# Patient Record
Sex: FEMALE | Race: WHITE
[De-identification: names, ages, dates, MRNs, and addresses within clinical notes are randomized per-mention and may not be internally consistent; named-entity substitution may affect disease eponyms.]

---

## 2019-02-06 ENCOUNTER — HOSPITAL ENCOUNTER (EMERGENCY)
Dept: HOSPITAL 53 - M ED | Age: 19
Discharge: HOME | End: 2019-02-06
Payer: COMMERCIAL

## 2019-02-06 VITALS — DIASTOLIC BLOOD PRESSURE: 80 MMHG | SYSTOLIC BLOOD PRESSURE: 130 MMHG

## 2019-02-06 VITALS — HEIGHT: 63 IN | BODY MASS INDEX: 34.45 KG/M2 | WEIGHT: 194.4 LBS

## 2019-02-06 DIAGNOSIS — H65.03: ICD-10-CM

## 2019-02-06 DIAGNOSIS — J06.9: Primary | ICD-10-CM

## 2019-02-15 ENCOUNTER — HOSPITAL ENCOUNTER (OUTPATIENT)
Dept: HOSPITAL 53 - M LDO | Age: 19
Discharge: HOME | End: 2019-02-15
Attending: OBSTETRICS & GYNECOLOGY
Payer: COMMERCIAL

## 2019-02-15 VITALS — HEIGHT: 63 IN | WEIGHT: 197.09 LBS | BODY MASS INDEX: 34.92 KG/M2

## 2019-02-15 VITALS — SYSTOLIC BLOOD PRESSURE: 119 MMHG | DIASTOLIC BLOOD PRESSURE: 63 MMHG

## 2019-02-15 DIAGNOSIS — Z04.3: Primary | ICD-10-CM
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
## 2019-02-15 DIAGNOSIS — Z3A.35: ICD-10-CM

## 2019-02-15 DIAGNOSIS — O26.893: ICD-10-CM
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
## 2019-02-15 LAB
APTT BLD: 27.4 SECONDS (ref 25.4–37.6)
CLOSURE TME COLL+EPINEP BLD: 105 SECONDS (ref 74–162)
HCT VFR BLD AUTO: 31.4 % (ref 36–47)
HGB BLD-MCNC: 10.6 G/DL (ref 12–15.5)
INR PPP: 1
MCH RBC QN AUTO: 31.5 PG (ref 27–33)
MCHC RBC AUTO-ENTMCNC: 33.8 G/DL (ref 32–36.5)
MCV RBC AUTO: 93.5 FL (ref 80–96)
PLATELET # BLD AUTO: 202 10^3/UL (ref 150–450)
PROTHROMBIN TIME: 13.3 SECONDS (ref 12.1–14.4)
RBC # BLD AUTO: 3.36 10^6/UL (ref 4–5.4)
WBC # BLD AUTO: 8.8 10^3/UL (ref 4–10)

## 2019-02-15 PROCEDURE — 85384 FIBRINOGEN ACTIVITY: CPT

## 2019-02-15 PROCEDURE — 85576 BLOOD PLATELET AGGREGATION: CPT

## 2019-02-15 PROCEDURE — 85027 COMPLETE CBC AUTOMATED: CPT

## 2019-02-15 PROCEDURE — 36415 COLL VENOUS BLD VENIPUNCTURE: CPT

## 2019-02-15 PROCEDURE — 59025 FETAL NON-STRESS TEST: CPT

## 2019-02-15 PROCEDURE — 76815 OB US LIMITED FETUS(S): CPT

## 2019-02-15 PROCEDURE — 85610 PROTHROMBIN TIME: CPT

## 2019-02-15 PROCEDURE — 76820 UMBILICAL ARTERY ECHO: CPT

## 2019-02-15 PROCEDURE — 85730 THROMBOPLASTIN TIME PARTIAL: CPT

## 2019-02-15 NOTE — NUR
[facility] L&D Triage Note: 



S: This is a 17 y/o G1 at 35+5 weeks who presented s/p falling down a couple of 
stairs and landing on her buttocks/low back earlier this morning. Following 
her time during evaluation on L&D she continues to deny VB/LOF/CTX and 
endorses excellent FM. Her pregnancy has been c/b anemia, smoking but quit, 
and she is giving this baby up for adoption. 



O: VSS/AF

GEN A&Ox3

PUL: CTAB

CV: RRR

ABD: gravid, NNTP; relaxed uterine resting tone

[place]: no palpable ctx

EXT: + DTR's, NEG clonus



FHR: CAT 1, no ctx

TAUS: SIUP; FHR 135bpm. DARIN 13.2. Cephalic vtx, anterior placenta no previa or 
abruption

LABS: H/H-10.6; 31.4; Fibrinogen 533, 



A/P: 17 y/o G1 35+5 s/p fall. After 4 hours of continuous FM w/ CAT I FHR. She 
is being dc'd to home with reassuring fetal status and no evidence of 
abruption. Extensive review of warning signs, return precautions, and 
reinforced importance of daily FMC assessments. She has MANNIE this week and has 
been instructed to keep appt. Pt reports understanding of all instructions w/o 
questions/concerns.

## 2019-02-15 NOTE — REP
OB ULTRASOUND:

 

Real-time sonographic evaluation of gravid uterus performed. There is a single

living intrauterine gestation. Estimated gestational age is 37 weeks 1 day based

on initial ultrasound, EDC 03/07/2019. Fetal heart rate 135 beats per minute.

Amniotic fluid within normal limits, DARIN 13.2 within normal range of 7.8 to 24.9.

S/D ratio 3.23 is slightly above normal range of 2.0 to 3.0. RI 0.69, within

normal range of 0.59 to 0.75. Fetal position vertex. Placenta anterior with no

previa or abruption.

 

 

Electronically Signed by

Bill Schroeder MD 02/15/2019 04:01 P

## 2019-03-22 ENCOUNTER — HOSPITAL ENCOUNTER (INPATIENT)
Dept: HOSPITAL 53 - M LDO | Age: 19
LOS: 3 days | Discharge: HOME | End: 2019-03-25
Attending: OBSTETRICS & GYNECOLOGY | Admitting: OBSTETRICS & GYNECOLOGY
Payer: COMMERCIAL

## 2019-03-22 VITALS — SYSTOLIC BLOOD PRESSURE: 106 MMHG | DIASTOLIC BLOOD PRESSURE: 67 MMHG

## 2019-03-22 VITALS — SYSTOLIC BLOOD PRESSURE: 151 MMHG | DIASTOLIC BLOOD PRESSURE: 88 MMHG

## 2019-03-22 VITALS — SYSTOLIC BLOOD PRESSURE: 114 MMHG | DIASTOLIC BLOOD PRESSURE: 70 MMHG

## 2019-03-22 VITALS — SYSTOLIC BLOOD PRESSURE: 133 MMHG | DIASTOLIC BLOOD PRESSURE: 87 MMHG

## 2019-03-22 VITALS — DIASTOLIC BLOOD PRESSURE: 53 MMHG | SYSTOLIC BLOOD PRESSURE: 149 MMHG

## 2019-03-22 VITALS — DIASTOLIC BLOOD PRESSURE: 56 MMHG | SYSTOLIC BLOOD PRESSURE: 106 MMHG

## 2019-03-22 VITALS — DIASTOLIC BLOOD PRESSURE: 102 MMHG | SYSTOLIC BLOOD PRESSURE: 155 MMHG

## 2019-03-22 VITALS — DIASTOLIC BLOOD PRESSURE: 72 MMHG | SYSTOLIC BLOOD PRESSURE: 122 MMHG

## 2019-03-22 VITALS — DIASTOLIC BLOOD PRESSURE: 84 MMHG | SYSTOLIC BLOOD PRESSURE: 125 MMHG

## 2019-03-22 VITALS — DIASTOLIC BLOOD PRESSURE: 71 MMHG | SYSTOLIC BLOOD PRESSURE: 123 MMHG

## 2019-03-22 VITALS — WEIGHT: 206.57 LBS | HEIGHT: 63 IN | BODY MASS INDEX: 36.6 KG/M2

## 2019-03-22 VITALS — DIASTOLIC BLOOD PRESSURE: 81 MMHG | SYSTOLIC BLOOD PRESSURE: 141 MMHG

## 2019-03-22 VITALS — SYSTOLIC BLOOD PRESSURE: 137 MMHG | DIASTOLIC BLOOD PRESSURE: 75 MMHG

## 2019-03-22 VITALS — DIASTOLIC BLOOD PRESSURE: 83 MMHG | SYSTOLIC BLOOD PRESSURE: 127 MMHG

## 2019-03-22 VITALS — DIASTOLIC BLOOD PRESSURE: 62 MMHG | SYSTOLIC BLOOD PRESSURE: 140 MMHG

## 2019-03-22 VITALS — DIASTOLIC BLOOD PRESSURE: 85 MMHG | SYSTOLIC BLOOD PRESSURE: 135 MMHG

## 2019-03-22 VITALS — DIASTOLIC BLOOD PRESSURE: 73 MMHG | SYSTOLIC BLOOD PRESSURE: 114 MMHG

## 2019-03-22 VITALS — SYSTOLIC BLOOD PRESSURE: 161 MMHG | DIASTOLIC BLOOD PRESSURE: 110 MMHG

## 2019-03-22 VITALS — DIASTOLIC BLOOD PRESSURE: 68 MMHG | SYSTOLIC BLOOD PRESSURE: 109 MMHG

## 2019-03-22 VITALS — DIASTOLIC BLOOD PRESSURE: 85 MMHG | SYSTOLIC BLOOD PRESSURE: 126 MMHG

## 2019-03-22 VITALS — SYSTOLIC BLOOD PRESSURE: 112 MMHG | DIASTOLIC BLOOD PRESSURE: 54 MMHG

## 2019-03-22 VITALS — DIASTOLIC BLOOD PRESSURE: 91 MMHG | SYSTOLIC BLOOD PRESSURE: 124 MMHG

## 2019-03-22 VITALS — SYSTOLIC BLOOD PRESSURE: 140 MMHG | DIASTOLIC BLOOD PRESSURE: 91 MMHG

## 2019-03-22 VITALS — SYSTOLIC BLOOD PRESSURE: 145 MMHG | DIASTOLIC BLOOD PRESSURE: 86 MMHG

## 2019-03-22 VITALS — SYSTOLIC BLOOD PRESSURE: 134 MMHG | DIASTOLIC BLOOD PRESSURE: 95 MMHG

## 2019-03-22 VITALS — DIASTOLIC BLOOD PRESSURE: 89 MMHG | SYSTOLIC BLOOD PRESSURE: 127 MMHG

## 2019-03-22 VITALS — DIASTOLIC BLOOD PRESSURE: 67 MMHG | SYSTOLIC BLOOD PRESSURE: 112 MMHG

## 2019-03-22 VITALS — SYSTOLIC BLOOD PRESSURE: 109 MMHG | DIASTOLIC BLOOD PRESSURE: 67 MMHG

## 2019-03-22 VITALS — DIASTOLIC BLOOD PRESSURE: 66 MMHG | SYSTOLIC BLOOD PRESSURE: 109 MMHG

## 2019-03-22 VITALS — DIASTOLIC BLOOD PRESSURE: 82 MMHG | SYSTOLIC BLOOD PRESSURE: 118 MMHG

## 2019-03-22 VITALS — DIASTOLIC BLOOD PRESSURE: 86 MMHG | SYSTOLIC BLOOD PRESSURE: 133 MMHG

## 2019-03-22 VITALS — DIASTOLIC BLOOD PRESSURE: 87 MMHG | SYSTOLIC BLOOD PRESSURE: 128 MMHG

## 2019-03-22 VITALS — SYSTOLIC BLOOD PRESSURE: 118 MMHG | DIASTOLIC BLOOD PRESSURE: 67 MMHG

## 2019-03-22 VITALS — DIASTOLIC BLOOD PRESSURE: 88 MMHG | SYSTOLIC BLOOD PRESSURE: 153 MMHG

## 2019-03-22 VITALS — SYSTOLIC BLOOD PRESSURE: 120 MMHG | DIASTOLIC BLOOD PRESSURE: 63 MMHG

## 2019-03-22 VITALS — DIASTOLIC BLOOD PRESSURE: 61 MMHG | SYSTOLIC BLOOD PRESSURE: 100 MMHG

## 2019-03-22 VITALS — SYSTOLIC BLOOD PRESSURE: 112 MMHG | DIASTOLIC BLOOD PRESSURE: 70 MMHG

## 2019-03-22 VITALS — SYSTOLIC BLOOD PRESSURE: 134 MMHG | DIASTOLIC BLOOD PRESSURE: 80 MMHG

## 2019-03-22 VITALS — SYSTOLIC BLOOD PRESSURE: 103 MMHG | DIASTOLIC BLOOD PRESSURE: 61 MMHG

## 2019-03-22 VITALS — DIASTOLIC BLOOD PRESSURE: 74 MMHG | SYSTOLIC BLOOD PRESSURE: 124 MMHG

## 2019-03-22 VITALS — DIASTOLIC BLOOD PRESSURE: 76 MMHG | SYSTOLIC BLOOD PRESSURE: 126 MMHG

## 2019-03-22 VITALS — SYSTOLIC BLOOD PRESSURE: 145 MMHG | DIASTOLIC BLOOD PRESSURE: 75 MMHG

## 2019-03-22 VITALS — SYSTOLIC BLOOD PRESSURE: 140 MMHG | DIASTOLIC BLOOD PRESSURE: 89 MMHG

## 2019-03-22 VITALS — DIASTOLIC BLOOD PRESSURE: 62 MMHG | SYSTOLIC BLOOD PRESSURE: 136 MMHG

## 2019-03-22 VITALS — SYSTOLIC BLOOD PRESSURE: 135 MMHG | DIASTOLIC BLOOD PRESSURE: 89 MMHG

## 2019-03-22 VITALS — DIASTOLIC BLOOD PRESSURE: 91 MMHG | SYSTOLIC BLOOD PRESSURE: 147 MMHG

## 2019-03-22 VITALS — DIASTOLIC BLOOD PRESSURE: 89 MMHG | SYSTOLIC BLOOD PRESSURE: 122 MMHG

## 2019-03-22 VITALS — DIASTOLIC BLOOD PRESSURE: 59 MMHG | SYSTOLIC BLOOD PRESSURE: 108 MMHG

## 2019-03-22 VITALS — DIASTOLIC BLOOD PRESSURE: 51 MMHG | SYSTOLIC BLOOD PRESSURE: 90 MMHG

## 2019-03-22 VITALS — DIASTOLIC BLOOD PRESSURE: 85 MMHG | SYSTOLIC BLOOD PRESSURE: 133 MMHG

## 2019-03-22 VITALS — DIASTOLIC BLOOD PRESSURE: 72 MMHG | SYSTOLIC BLOOD PRESSURE: 132 MMHG

## 2019-03-22 VITALS — SYSTOLIC BLOOD PRESSURE: 127 MMHG | DIASTOLIC BLOOD PRESSURE: 76 MMHG

## 2019-03-22 VITALS — DIASTOLIC BLOOD PRESSURE: 85 MMHG | SYSTOLIC BLOOD PRESSURE: 128 MMHG

## 2019-03-22 VITALS — DIASTOLIC BLOOD PRESSURE: 76 MMHG | SYSTOLIC BLOOD PRESSURE: 139 MMHG

## 2019-03-22 VITALS — SYSTOLIC BLOOD PRESSURE: 137 MMHG | DIASTOLIC BLOOD PRESSURE: 89 MMHG

## 2019-03-22 VITALS — SYSTOLIC BLOOD PRESSURE: 124 MMHG | DIASTOLIC BLOOD PRESSURE: 91 MMHG

## 2019-03-22 VITALS — SYSTOLIC BLOOD PRESSURE: 117 MMHG | DIASTOLIC BLOOD PRESSURE: 61 MMHG

## 2019-03-22 VITALS — DIASTOLIC BLOOD PRESSURE: 88 MMHG | SYSTOLIC BLOOD PRESSURE: 140 MMHG

## 2019-03-22 VITALS — DIASTOLIC BLOOD PRESSURE: 75 MMHG | SYSTOLIC BLOOD PRESSURE: 120 MMHG

## 2019-03-22 VITALS — DIASTOLIC BLOOD PRESSURE: 82 MMHG | SYSTOLIC BLOOD PRESSURE: 129 MMHG

## 2019-03-22 VITALS — DIASTOLIC BLOOD PRESSURE: 55 MMHG | SYSTOLIC BLOOD PRESSURE: 117 MMHG

## 2019-03-22 VITALS — SYSTOLIC BLOOD PRESSURE: 118 MMHG | DIASTOLIC BLOOD PRESSURE: 74 MMHG

## 2019-03-22 DIAGNOSIS — Z3A.40: ICD-10-CM

## 2019-03-22 DIAGNOSIS — O48.0: Primary | ICD-10-CM

## 2019-03-22 DIAGNOSIS — O26.03: ICD-10-CM

## 2019-03-22 LAB
BASE EXCESS BLDCOA CALC-SCNC: -7.4 MMOL/L
BASE EXCESS BLDCOV CALC-SCNC: -3.9 MMOL/L
CO2 BLDCOA CALC-SCNC: 20.1 MEQ/L
CO2 BLDCOV CALC-SCNC: 23.6 MEQ/L
HCO3 STD BLDCOA-SCNC: 18 MEQ/L
HCO3 STD BLDCOA-SCNC: 18.8 MEQ/L
HCO3 STD BLDCOV-SCNC: 20.2 MEQ/L
HCO3 STD BLDCOV-SCNC: 22.3 MEQ/L
HCT VFR BLD AUTO: 34.4 % (ref 36–47)
HGB BLD-MCNC: 11.1 G/DL (ref 12–15.5)
MCH RBC QN AUTO: 30.2 PG (ref 27–33)
MCHC RBC AUTO-ENTMCNC: 32.3 G/DL (ref 32–36.5)
MCV RBC AUTO: 93.7 FL (ref 80–96)
PCO2 BLDCOA: 40.8 MMHG
PCO2 BLDCOV: 44.3 MMHG
PH BLDCOA: 7.28 UNITS
PH BLDCOV: 7.32 UNITS
PLATELET # BLD AUTO: 207 10^3/UL (ref 150–450)
PO2 BLDCOA: 35.9 MMHG
PO2 BLDCOV: 26.1 MMHG
RBC # BLD AUTO: 3.67 10^6/UL (ref 4–5.4)
SAO2 % BLDCOA: 74.9 %
SAO2 % BLDCOV: 57.1 %
WBC # BLD AUTO: 11.8 10^3/UL (ref 4–10)

## 2019-03-22 PROCEDURE — 0KQM0ZZ REPAIR PERINEUM MUSCLE, OPEN APPROACH: ICD-10-PCS | Performed by: OBSTETRICS & GYNECOLOGY

## 2019-03-22 RX ADMIN — SODIUM CHLORIDE, POTASSIUM CHLORIDE, SODIUM LACTATE AND CALCIUM CHLORIDE SCH MLS/HR: 600; 310; 30; 20 INJECTION, SOLUTION INTRAVENOUS at 03:06

## 2019-03-22 RX ADMIN — Medication SCH MLS/HR: at 13:35

## 2019-03-22 RX ADMIN — SODIUM CHLORIDE, POTASSIUM CHLORIDE, SODIUM LACTATE AND CALCIUM CHLORIDE SCH MLS/HR: 600; 310; 30; 20 INJECTION, SOLUTION INTRAVENOUS at 14:33

## 2019-03-22 RX ADMIN — IBUPROFEN PRN MG: 800 TABLET, FILM COATED ORAL at 21:00

## 2019-03-22 RX ADMIN — Medication SCH MLS/HR: at 05:05

## 2019-03-22 RX ADMIN — SODIUM CHLORIDE, POTASSIUM CHLORIDE, SODIUM LACTATE AND CALCIUM CHLORIDE SCH MLS/HR: 600; 310; 30; 20 INJECTION, SOLUTION INTRAVENOUS at 08:35

## 2019-03-22 NOTE — HPEPDOC
Obstetrical History & Physical


General


Date of Admission


2019





History of Present Illness


Diane is a 17yo  with SIUP at 40w5d by lmp c/w early u/s presenting to L&D 

for painful ctx. No LOF, no vaginal bleeding, feels good fetal movement. On RN 

check, patient /-2. On re-check after 1 hour, SCE 3/90/-2.


Chief Complaint:  Contractions, term





Prenatal Care


Prenatal Care:  Good Care





Prenatal Dating


Final EDC:  Mar 17, 2019


Final EDC by:  LMP, 1st trimester (US)





Antepartum Course


Prenatal Diagnos(e)s


Overweight with starting BMI 26.6 and Excessive weight gain (53 pounds), smoked 

1 cig/day but quit with pregnancy, mild bilateral renal hydronephrosis (will 

make pediatric team aware), anemia (H/H at 28wk 10.6/31.2), patient plans to 

adopt the baby out


Height (inches):  63


Pre-Pregnancy weight (lbs.):  150


Admission Weight (lbs.):  203


Change in Weight (lbs.):  53





Past Medical History


Past Obstetrical History :  


   Past Obstetrical History:  Primgravida


Past Medical History


Medical History


Overweight (starting BMI  26.6)


Surgical History:  [place] teeth





Family History


Significant Family History:  No pertinent family hx





Social History


Marital Status:  Single


Psychosocial History:  No pertinent psych hx


* Smoker:  former Smoker


Alcohol:  Denies


Drugs:  denies





Prenatal Imunizations


Tdap status:  current


Influenza Status:  current





Allergies


Coded Allergies:  


     No Known Allergies (Verified , 03)





Medications


Scheduled


Ferrous Sulfate (Ferrous Sulfate) 325 Mg Tab, 325 MG PO TID


Multivitamins/Prenatal (Prenatal 27-0.8 mg) 1 Tab Tab, 1 TAB PO DAILY





Physical Examination


Physical Examination


GENERAL: Alert and oriented times three.


ABDOMEN: Gravid and non-tender to touch.


FETUS: Is vertex (VTX) by sterile vaginal examination (SVE)


EXTREMITIES: trace pedal edema BLE





Vital Signs/I&O





Vital Signs








  Date Time  Temp Pulse Resp B/P (MAP) Pulse Ox O2 Delivery O2 Flow Rate FiO2


 


3/22/19 02:38 99.0 104 20 139/76 (97)    











Pertinent Laboratoy Data


Blood Type:  O+


RBC Antibody Screen:  Negative


HIV:  Negative


Hepatitis B:  Negative


Hepatitis C:  Unknown


Rapid Plasma Reagin:  Nonreactive


Rubella:  Immune


Chlamydia/Gonorrhea:  Negative


Group B Streptococcus:  Negative


Glucose Tolerance Test:  81





Anatomy Ultrasound


Ultrasound Date:  2018


Placenta Location:  Anterior


Normal Anatomy:  No (bilateral renal hydronephrosis)


Placenta Previa:  No





Other Ultrasounds


PNC repeat ultrasound 1/15/19: UPJ dilation, right kidney 4.2mm, left kidney 6.6

mm





 Steroid Therapy


 Steroid Therapy:  No





Vaginal Examination


Dilation:  3 cm


Effacement:  90%


Station:  -2


Cervical Consistency:  Soft


Cervical Position:  Middle


Fetal Presentation:  Cephalic presentation





Fetal Assessment


Fetal Heart Rate (FHR):  140


Variability:  Moderate


Accelerations:  Positive


Decelerations:  None





Tocometer


Contractions:  Yes


Frequency:  regular, every 2-5 min.


Duration:  greater than 60 seconds


Strength:  palpated as moderate





Assessment/Plan


Assessment


Diane is a 17yo  with SIUP at 40w5d by lmp c/w early u/s admitted to L&D for 

latent labor with cervical change, 50/-2 to 3/90/-2 over an hour in triage. 

Cat I FHRT with ctx q3min. Vitals wnl, afebrile, benign exam. Cephalic by SCE. 

GBS negative. 





Prenatal course significant for: Overweight with starting BMI 26.6 and Excessive

weight gain (53 pounds), smoked 1 cig/day but quit with pregnancy, mild 

bilateral renal hydronephrosis (will make pediatric team aware), anemia (H/H at 

28wk 10.6/31.2), patient plans to adopt the baby out





Plan


Admit and orient.


 and consent. Discussed use of pitocin if labor stalls, patient is 

amenable. 


Diet: clear liquids


Group B Streptococcus (GBS) negative


Labs and intravenous (IV) per unit protocol.


Lactated Ringers (LR): Bolus 1000 mL, then at 125 mL/hr.


Anticipate normal spontaneous delivery ()


Candidate for epidural as desired


Will notify pediatric team of fetal renal hydronephrosis





MD Shannon Chaparro Katrina D MD           Mar 22, 2019 03:26

## 2019-03-22 NOTE — DNPDOC
[facility] Delivery Note


Delivery Note


DATE OF DELIVERY: 2019 at ~2000 





PREDELIVERY DIAGNOSIS: 40+5 weeks gestation and labor. 





POST DELIVERY DIAGNOSIS: Delivered.





PROCEDURE: Vacuum assisted vaginal delivery





OBSTETRICIAN: Dr. Randhawa





ANESTHESIA: Epidural





ESTIMATED BLOOD LOSS: 300 mL.





FINDINGS: 9 pound 3 ounce male infant, Apgar Score 9/9





DELIVERY SUMMARY: Pushed with Diane for approximately 1.5 hours.  She was moving 

the fetal head down to +3 station and had excellent effort.  However she became 

exhausted with her efforts (total pushing time ~3 hours) and requested 

assistance.  The fetal head was felt to be OA.  My EFW was ~9 pounds, and I felt

that her pelvis was adequate for delivery.  I offered a trial of vacuum assisted

vaginal delivery vs  section.  We discussed all risks of vacuum 

deliveries to include, but not limited to, fetal scalp laceration, 

cephalohematoma, retinal hemorrhage, subgaleal hemorrhage with subsequent 

significant fetal morbidity and potential for mortality, and failure to effect 

delivery which would require a higher risk  section.  We also discussed 

all risks of  section. She verbalized understanding of these risks and 

opted to proceed with vacuum assisted vaginal delivery with indication of 

maternal exhaustion.  FHR tracing remained Cat II.





A Kiwi vacuum device was opened on the field, tested, and found to be 

functional.  The patient's bladder had previously been emptied as her Marie 

catheter was recently removed.  The fetal head was felt to be in OA position.  

The kiwi vacuum was then placed on the fetal scalp at the flexion point per the 

's guidelines.  Care was taken to ensure that no maternal tissue was

caught within the device.  During contractions the vacuum was activated up to 

the green zone (400-600mmHg) and gentle traction was applied to effect delivery.

 There were two pop offs, but after 4 sets of pushes with the device the fetal 

head delivered OA.  The kiwi vacuum pressure was released and the device was 

discarded off the field.  The left anterior shoulder delivered with gentle 

guidance followed easily by the remainder of the body.  The infant was dried and

stimulated on the field.  The infant cried vigorously after delivery and was 

placed on the maternal abdomen.  The three vessel cord was then clamped and cut 

by the FOB.  3rd stage was completed with gentle traction on the cord it was 

productive of an intact placenta.  The uterine fundus was firmed with massage 

and pitocin and was administered IV bolus.  Inspection of the vagina, perineum, 

and cervix revealed bilateral sidewall lacerations and a midline 2nd degree 

perineal laceration.  These lacerations were repaired with 3-0 vicryl suture in 

the usual fashion.  There was excellent cosmesis and hemostasis.  The fundus was

palpated again and was firm.  All sponge, instrument, and needle counts were 

correct X2.  Mother stable when I left the room.





DO RADHA Angeles CHRISTOPHER J. DO         Mar 22, 2019 21:09

## 2019-03-23 VITALS — DIASTOLIC BLOOD PRESSURE: 82 MMHG | SYSTOLIC BLOOD PRESSURE: 124 MMHG

## 2019-03-23 VITALS — DIASTOLIC BLOOD PRESSURE: 61 MMHG | SYSTOLIC BLOOD PRESSURE: 117 MMHG

## 2019-03-23 RX ADMIN — IBUPROFEN PRN MG: 800 TABLET, FILM COATED ORAL at 14:59

## 2019-03-23 RX ADMIN — ACETAMINOPHEN PRN MG: 500 TABLET ORAL at 04:53

## 2019-03-23 RX ADMIN — Medication SCH TAB: at 08:12

## 2019-03-23 RX ADMIN — ACETAMINOPHEN PRN MG: 500 TABLET ORAL at 23:45

## 2019-03-24 VITALS — DIASTOLIC BLOOD PRESSURE: 87 MMHG | SYSTOLIC BLOOD PRESSURE: 126 MMHG

## 2019-03-24 VITALS — SYSTOLIC BLOOD PRESSURE: 127 MMHG | DIASTOLIC BLOOD PRESSURE: 83 MMHG

## 2019-03-24 RX ADMIN — ACETAMINOPHEN PRN MG: 500 TABLET ORAL at 06:32

## 2019-03-24 RX ADMIN — IBUPROFEN PRN MG: 800 TABLET, FILM COATED ORAL at 20:58

## 2019-03-24 RX ADMIN — Medication SCH TAB: at 10:17

## 2019-03-24 NOTE — DS.PDOC
Discharge Summary


General


Date of Admission


Mar 22, 2019 at 03:10


Date of Discharge


24MAR2019





Discharge Summary


PROCEDURES PERFORMED DURING STAY: VAVD





ADMITTING DIAGNOSES: 


1. Active labor





DISCHARGE DIAGNOSES:


1. s/p VAVD





COMPLICATIONS/CHIEF COMPLAINT: Labor Check.





HISTORY OF PRESENT ILLNESS: pregnancy complicated by starting BMI-26.6; smoker-

quit in early pregnancy; anemia; fetus-bilat renal heypronephrosis; babe is 

being adopted out





HOSPITAL COURSE: presented in active labor, augmented with pitocin, VAVD 





DISCHARGE MEDICATIONS: tylenol, motrin, dibucaine


 


ALLERGIES: See below





PHYSICAL EXAMINATION ON DISCHARGE:


VITAL SIGNS: Please see below.


GENERAL: A&O x 3


CARDIOVASCULAR EXAMINATION: RRR, no m/r/g


RESPIRATORY EXAMINATION: CTA


ABDOMINAL EXAMINATION: no TTP, fundus firm @ U-3, Small amount of bright red 

bleeding required < 1 pad overnight, voiding and stooling without difficulty


EXTREMITIES: ambulating without difficulty


BREAST: filling


PSYCHIATRIC EXAMINATION: Denies SI/HI





LABORATORY DATA: Please see below.





PROGNOSIS: good





ACTIVITY:  As tolerated; slowly start walking in the next several weeks





DIET: REGULAR





DISCHARGE PLAN: Discharge to home today





DISPOSITION: today





DISCHARGE INSTRUCTIONS:


1. Schedule PP appt @ OB Clinic in 6 wks


2. Desires OCPs for birth control


3. Pelvic rest for 6 wks


4. Use backup form of birth control if having sex prior to starting OCPs


5. Travel precautions


6. Danger and warning s/s to report to ED including f/c/n/v/d, foul smelling 

discharge, increasing pelvic, breast or perineal pain





ITEMS TO FOLLOWUP ON ON OUTPATIENT:


1. PP appt


2. pelvic rest





DISCHARGE CONDITION: stable





TIME SPENT ON DISCHARGE: Greater than 20 minutes.





Vital Signs/I&Os





Vital Signs








  Date Time  Temp Pulse Resp B/P (MAP) Pulse Ox O2 Delivery O2 Flow Rate FiO2


 


3/24/19 06:00 97.9 79 18 127/83 (98)    


 


3/23/19 18:08     99   











Discharge Medications


Scheduled


Ferrous Sulfate (Ferrous Sulfate) 325 Mg Tab, 325 MG PO TID, (Reported)


Multivitamins/Prenatal (Prenatal 27-0.8 mg) 1 Tab Tab, 1 TAB PO DAILY, 

(Reported)





Allergies


Coded Allergies:  


     No Known Allergies (Verified , 7/7/03)











LARA SUAZO CNM            Mar 24, 2019 06:43

## 2019-03-25 VITALS — SYSTOLIC BLOOD PRESSURE: 117 MMHG | DIASTOLIC BLOOD PRESSURE: 64 MMHG

## 2019-03-25 RX ADMIN — ACETAMINOPHEN PRN MG: 500 TABLET ORAL at 09:56

## 2019-03-25 RX ADMIN — Medication SCH TAB: at 08:16

## 2019-03-25 NOTE — IPNPDOC
Postpartum Progress Note


Date of Service:  Mar 23, 2019


Postpartum Progress Note





Diane is a 17 yo G1 now P1 who underwent an uncomplicated VAVD on 22Mar2019 after

being admitted for early labor.





She reports feeling well this morning.  She is ambulating, voiding, and 

tolerating a regular diet.  She has minimal lochia.  Her bottom is sore, but not

significantly so.  





Vitals - VSS, afebrile, normotensive, non tachycardic


General - AAOX3, sitting up in bed, NAD


Abdomen - Fundus firm at U-2.  No fundal tenderness.


Extremities - No edema





Diane is doing well this AM and is making an appropriate postpartum recovery.  

Continue to encourage ambulation today.  Likely discharge home tomorrow AM.  The

baby is being given up for adoption. 





Wilmar Randhawa DO





VS, I&O, 24H, Fishbone


Vital Signs/I&O





Vital Signs








  Date Time  Temp Pulse Resp B/P (MAP) Pulse Ox O2 Delivery O2 Flow Rate FiO2


 


3/23/19 06:00 96.3 92 17 117/61 (79)    














I&O- Last 24 Hours up to 6 AM 


 


 3/23/19





 06:00


 


Intake Total 4591 ml


 


Output Total 2200 ml


 


Balance 2391 ml











Laboratory Data


24H LABS


Laboratory Tests 2


3/22/19 20:10: 


Cord Arterial Blood pH 7.282, Cord Arterial Blood PCO2 40.8, Cord Arterial Blood

PO2 35.9, Cord Arterial Blood HCO3 18.8, Cord Arterial Blood Total CO2 20.1, 

Cord Arterial Blood Base Excess -7.4, Cord Arterial Base Excess (Standard 18.0, 

Cord Arterial Bld Oxygen Saturation 74.9, Cord Venous Blood pH 7.319, Cord 

Venous Blood PCO2 44.3, Cord Venous Blood PO2 26.1, Cord Venous Blood HCO3 22.3,

Cord Venous Blood Total CO2 23.6, Cord Venous Base Excess (Actual) -3.9, Cord 

Venous Base Excess (Standard) 20.2, Cord Venous Blood Oxygen Saturation 57.1


3/23/19 00:06: Bedside Glucose (Misc Panel) 42L











WILMAR RANDHAWA DO         Mar 23, 2019 08:14
Text Note


Date of Service


The patient was seen on 3/22/19.





NOTE





Patient C/C/+1 - +2.





FHR Cat I.





Will begin pushing.





DO Edis





VS,Mikael, I+O


VS, Mikael, I+O


Laboratory Tests


3/22/19 03:57








Red Blood Count 3.67 L, Mean Corpuscular Volume 93.7, Mean Corpuscular 

Hemoglobin 30.2, Mean Corpuscular Hemoglobin Concent 32.3, Red Cell Distribution

Width 14.3








Vital Signs








  Date Time  Temp Pulse Resp B/P (MAP) Pulse Ox O2 Delivery O2 Flow Rate FiO2


 


3/22/19 08:55  101 18 108/59 (75)    


 


3/22/19 07:26 98.5       

















WILMAR GARCIA DO         Mar 22, 2019 17:19
Text Note


Date of Service


The patient was seen on 3/22/19.





NOTE





Presented to room for assessment of progress.  Her epidural tubing disconnected 

but has been hooked up again and pain relief is back.  She feels lower pelvic 

pressure.





Cervix: 9/C/0.





FHR remains Cat I.





Continue with pitocin.








DO Edis





VS,Mikael, I+O


VS, Mikael, I+O


Laboratory Tests


3/22/19 03:57








Red Blood Count 3.67 L, Mean Corpuscular Volume 93.7, Mean Corpuscular 

Hemoglobin 30.2, Mean Corpuscular Hemoglobin Concent 32.3, Red Cell Distribution

Width 14.3








Vital Signs








  Date Time  Temp Pulse Resp B/P (MAP) Pulse Ox O2 Delivery O2 Flow Rate FiO2


 


3/22/19 08:55  101 18 108/59 (75)    


 


3/22/19 07:26 98.5       

















WILMAR GARCIA DO         Mar 22, 2019 14:21
Text Note


Date of Service


The patient was seen on 3/22/19.





NOTE





SBAR received from Dr. Ortega regarding Diane.  She's a 19 yo  at 40+5 weeks

who presented in early latent labor.  Pregnancy has been uncomplicated.  I 

presented to the room for assessment.





She was started on pitocin for labor augmentation.  She has had worsening pain 

and received an epidural.  SROM, clear fluid, occurred at ~1115.





Cervix: 8/90/-1 per RN exam.





FHR: Cat II, moderate variability, +accels, +variable decels after SROM.





IV fluid bolus has been given and positional changes initiated.  Patient 

progressing well.  Will continue to monitor closely.





DO Edis





VS,Mikael, I+O


VS, Mikael, I+O


Laboratory Tests


3/22/19 03:57








Red Blood Count 3.67 L, Mean Corpuscular Volume 93.7, Mean Corpuscular 

Hemoglobin 30.2, Mean Corpuscular Hemoglobin Concent 32.3, Red Cell Distribution

Width 14.3








Vital Signs








  Date Time  Temp Pulse Resp B/P (MAP) Pulse Ox O2 Delivery O2 Flow Rate FiO2


 


3/22/19 08:55  101 18 108/59 (75)    


 


3/22/19 07:26 98.5       

















WILMAR GARCIA DO         Mar 22, 2019 11:41
Text Note


Date of Service


The patient was seen on 3/22/19.





NOTE


Diane has been pushing for ~2 hours.  Caput now +2 to +3 station with pushes.  

She has progressed well past +1 station.  She is becoming fatigued.





FHR remains reassuring.





Fetal head appears to be OP by exam, though Diane has great effort.  Will 

continue with pushing.  If no further progress after 30-60 minutes would 

consider operative vaginal delivery for maternal exhaustion or  

delivery.





DO Edis





VS,Mikael, I+O


VS, Mikael, I+O


Laboratory Tests


3/22/19 03:57








Red Blood Count 3.67 L, Mean Corpuscular Volume 93.7, Mean Corpuscular 

Hemoglobin 30.2, Mean Corpuscular Hemoglobin Concent 32.3, Red Cell Distribution

Width 14.3








Vital Signs








  Date Time  Temp Pulse Resp B/P (MAP) Pulse Ox O2 Delivery O2 Flow Rate FiO2


 


3/22/19 08:55  101 18 108/59 (75)    


 


3/22/19 07:26 98.5       

















WILMAR GARCIA DO         Mar 22, 2019 19:09
Text Note


Date of Service


The patient was seen on 3/22/19.





NOTE


Intrapartum Note





Pt doing well, received epidural and comfortable. 





Vitals wnl, afebrile





RN checked SCE at 0600 and reported unchanged, still 3/75/-2.





One prolonged FHR decel after epidural, but otherwise Cat I FHRT with 

+accels/mod mack/no other decels


[place]: ctx q2min





Will plan to begin pitocin and titrate per protocol, continue to closely monitor


SBAR given to Dr. Randhawa at 0730


Safe to proceed





Dr. Ainsley Ortega MD





VS,Mikael, I+O


VS, Mikael, I+O


Laboratory Tests


3/22/19 03:57








Red Blood Count 3.67 L, Mean Corpuscular Volume 93.7, Mean Corpuscular 

Hemoglobin 30.2, Mean Corpuscular Hemoglobin Concent 32.3, Red Cell Distribution

Width 14.3








Vital Signs








  Date Time  Temp Pulse Resp B/P (MAP) Pulse Ox O2 Delivery O2 Flow Rate FiO2


 


3/22/19 06:31 98.5 100 18 127/89 (102)    

















Ainsley Ortega MD           Mar 22, 2019 08:04
Text Note


Date of Service


The patient was seen on 3/25/19.





NOTE


VAVD PPD3





States feeling well, pain controlled with prescribed meds.  Baby bonding and 

feeding well.  No heavy VB.  Lochia slowing.  Ambulatory.  Tolerating PO without

issues.  Voiding spont.  No CP/LP/SOB.





VSSAF


NAD A&O


LE no C/C/E


Ut at U-2, firm





a/p: Doing well.  Cont routine postpartum care.  D/C today.  Was discharged 

yesterday,but legal team did not show and was kept one more day until papers for

adoption could be signed today.





Sessions Mikael RAPP, I+O


VSMikael, I+O





Vital Signs








  Date Time  Temp Pulse Resp B/P (MAP) Pulse Ox O2 Delivery O2 Flow Rate FiO2


 


3/25/19 05:57 98.2 81 18 117/64 (81)    


 


3/23/19 18:08     99   

















SESSIONSCASSI MD          Mar 25, 2019 07:28
05-Oct-2017 22:59